# Patient Record
Sex: FEMALE | Race: WHITE
[De-identification: names, ages, dates, MRNs, and addresses within clinical notes are randomized per-mention and may not be internally consistent; named-entity substitution may affect disease eponyms.]

---

## 2018-07-18 ENCOUNTER — HOSPITAL ENCOUNTER (EMERGENCY)
Dept: HOSPITAL 33 - ED | Age: 68
Discharge: HOME | End: 2018-07-18
Payer: MEDICARE

## 2018-07-18 VITALS — DIASTOLIC BLOOD PRESSURE: 75 MMHG | SYSTOLIC BLOOD PRESSURE: 144 MMHG | OXYGEN SATURATION: 96 % | HEART RATE: 63 BPM

## 2018-07-18 DIAGNOSIS — H92.09: ICD-10-CM

## 2018-07-18 DIAGNOSIS — Z72.0: ICD-10-CM

## 2018-07-18 DIAGNOSIS — F32.9: ICD-10-CM

## 2018-07-18 DIAGNOSIS — Z79.899: ICD-10-CM

## 2018-07-18 DIAGNOSIS — K11.20: Primary | ICD-10-CM

## 2018-07-18 LAB
ALBUMIN SERPL-MCNC: 3.9 G/DL (ref 3.5–5)
ALP SERPL-CCNC: 101 U/L (ref 38–126)
ALT SERPL-CCNC: 10 U/L (ref 0–35)
ANION GAP SERPL CALC-SCNC: 9.5 MEQ/L (ref 5–15)
AST SERPL QL: 13 U/L (ref 14–36)
BASOPHILS # BLD AUTO: 0.02 10*3/UL (ref 0–0.4)
BASOPHILS NFR BLD AUTO: 0.1 % (ref 0–0.4)
BILIRUB BLD-MCNC: 0.2 MG/DL (ref 0.2–1.3)
BUN SERPL-MCNC: 11 MG/DL (ref 7–17)
CALCIUM SPEC-MCNC: 9.2 MG/DL (ref 8.4–10.2)
CHLORIDE SERPL-SCNC: 103 MMOL/L (ref 98–107)
CO2 SERPL-SCNC: 28 MMOL/L (ref 22–30)
CREAT SERPL-MCNC: 0.62 MG/DL (ref 0.52–1.04)
EOSINOPHIL # BLD AUTO: 0 10*3/UL (ref 0–0.5)
GLUCOSE SERPL-MCNC: 99 MG/DL (ref 74–106)
GRANULOCYTES # BLD AUTO: 10.17 10*3/UL (ref 1.4–6.9)
HCT VFR BLD AUTO: 38.9 % (ref 35–47)
HGB BLD-MCNC: 13.5 GM/DL (ref 12–16)
LYMPHOCYTES # SPEC AUTO: 2.02 10*3/UL (ref 1–4.6)
MCH RBC QN AUTO: 32.8 PG (ref 26–32)
MCHC RBC AUTO-ENTMCNC: 34.7 G/DL (ref 32–36)
MONOCYTES # BLD AUTO: 1.36 10*3/UL (ref 0–1.3)
NEUTROPHILS NFR BLD AUTO: 75 % (ref 36–66)
PLATELET # BLD AUTO: 178 K/MM3 (ref 150–450)
POTASSIUM SERPLBLD-SCNC: 3.3 MMOL/L (ref 3.5–5.1)
PROT SERPL-MCNC: 6.8 G/DL (ref 6.3–8.2)
RBC # BLD AUTO: 4.11 M/MM3 (ref 4.1–5.4)
SODIUM SERPL-SCNC: 136 MMOL/L (ref 137–145)
WBC # BLD AUTO: 13.6 K/MM3 (ref 4–10.5)

## 2018-07-18 PROCEDURE — 86308 HETEROPHILE ANTIBODY SCREEN: CPT

## 2018-07-18 PROCEDURE — 87651 STREP A DNA AMP PROBE: CPT

## 2018-07-18 PROCEDURE — 80053 COMPREHEN METABOLIC PANEL: CPT

## 2018-07-18 PROCEDURE — 71046 X-RAY EXAM CHEST 2 VIEWS: CPT

## 2018-07-18 PROCEDURE — 85652 RBC SED RATE AUTOMATED: CPT

## 2018-07-18 PROCEDURE — 85025 COMPLETE CBC W/AUTO DIFF WBC: CPT

## 2018-07-18 PROCEDURE — 70490 CT SOFT TISSUE NECK W/O DYE: CPT

## 2018-07-18 PROCEDURE — 96361 HYDRATE IV INFUSION ADD-ON: CPT

## 2018-07-18 PROCEDURE — 96360 HYDRATION IV INFUSION INIT: CPT

## 2018-07-18 PROCEDURE — 96374 THER/PROPH/DIAG INJ IV PUSH: CPT

## 2018-07-18 PROCEDURE — 83605 ASSAY OF LACTIC ACID: CPT

## 2018-07-18 PROCEDURE — 36415 COLL VENOUS BLD VENIPUNCTURE: CPT

## 2018-07-18 PROCEDURE — 99284 EMERGENCY DEPT VISIT MOD MDM: CPT

## 2018-07-18 NOTE — XRAY
Indication: Cough.



Comparison: None



PA/lateral chest hyperinflated with chronic lung markings and a few incidental

calcified granulomas.  No focal infiltrate, consolidation, or large effusion.

Heart is not enlarged.  Small hiatal hernia suggested.  Bony thorax intact

with mild multilevel degenerative spondylosis and mild dextroscoliosis.



Impression: Nonacute chest with chronic features.

## 2018-07-18 NOTE — ERPHSYRPT
- History of Present Illness


Time Seen by Provider: 07/18/18 02:33


Source: patient


Exam Limitations: no limitations


Patient Subjective Stated Complaint: right side of neck and below ear are 

swollen


Triage Nursing Assessment: pt A&O x3, c/o right sided neck swelling, throat 

hurts, ear hurts, denies breathing difficulty, afebrile, nausea, /99, 

doesn't appear to be in any distress


Physician History: 





Pt started c/o sore throat, painful swallowing, nausea about one week ago. She 

was seen by her doctor yesterday, started on PO steroid. She noticed painful 

swelling in the right mandibular angle area, when eating tonight. She denies 

fever, chills, difficulty breathing, chest pain, vomiting, but nauseated. 


Timing/Duration: gradual onset


Severity: severe


ENT Location: ear (R), throat


Prearrival Treatment: prescription meds


Modifying Factors: Improves With: nothing


Associated Symptoms: ear pain (R), No ear drainage, No hearing loss


Allergies/Adverse Reactions: 








No Known Drug Allergies Allergy (Verified 07/18/18 02:38)


 





Home Medications: 








Atorvastatin Calcium [Lipitor 20MG Tablet] 20 mg PO DAILY 07/18/18 [History]


Bupropion HCl [Bupropion Xl] 300 mg PO DAILY 07/18/18 [History]


Levothyroxine Sodium 50 Mcg*** [Synthroid 50 Mcg***] 50 mcg PO DAILY 07/18/18 [

History]


Montelukast Sodium 10 mg** [Singulair 10 MG**] 10 mg PO DAILY 07/18/18 [History]


Omeprazole 40 mg PO DAILY 07/18/18 [History]


Ropinirole HCl 0.5 mg*** [Requip 0.5 MG***] 0.5 mg PO DAILY 07/18/18 [History]


Sucralfate 1 gm*** [Carafate 1 GM***] 1 gm PO DAILY 07/18/18 [History]


Trazodone HCl 50 mg*** [Desyrel 50 mg***] 50 mg PO DAILY 07/18/18 [History]








- Review of Systems


Constitutional: No Symptoms


Ears, Nose, & Throat: Ear Pain


Respiratory: Cough (chronic)


Cardiac: No Chest Pain, No Edema


All Other Systems: Reviewed and Negative





- Past Medical History


Respiratory History: COPD


Psycho-Social History: Depression





- Past Surgical History


Past Surgical History: Yes


Female Surgical History: Hysterectomy


Other Surgical History: tummy tuck





- Social History


Smoking Status: Current every day smoker


How long have you smoked: 50 years


Exposure to second hand smoke: Yes


Drug Use: none


Patient Lives Alone: No





- Nursing Vital Signs


Nursing Vital Signs: 


 Initial Vital Signs











Temperature  97.9 F   07/18/18 02:25


 


Pulse Rate  63   07/18/18 02:25


 


Blood Pressure  149/99   07/18/18 02:25


 


O2 Sat by Pulse Oximetry  98   07/18/18 02:25








 Pain Scale











Pain Intensity                 7

















- Physical Exam


General Appearance: no apparent distress


Eye Exam: bilateral eye: PERRL


Ear Exam: right ear: other (soft tissue swelling of the neck at the right 

mandibular abgle, no redness, no mass, or fluctuation, no skin lesion.), 

bilateral ear: canal normal, TM normal


Nasal Exam: normal inspection


Throat Exam: normal, pharynx normal, mandibular swelling (right mandibular angle

)


Neck Exam: normal inspection, non-tender, supple, full range of motion, trachea 

midline, No JVD, No lymphadenopathy (R), No lymphadenopathy (L), No thyromegaly


Cardiovascular/Respiratory Exam: chest non-tender, normal breath sounds, 

regular rate/rhythm, heart sounds normal, no respiratory distress, No no JVD


Abdominal Exam: non-tender, soft, no organomegaly


Neurologic Exam: alert, oriented x 3


Skin Exam: normal color, warm, dry, No rash


**SpO2 Interpretation**: normal


SpO2: 98


Oxygen Delivery: Room Air





- Course


Nursing assessment & vital signs reviewed: Yes





- CT Exams


  ** Soft Tissue Neck


CT Interpretation: Tele-radiologist Report, Other (right parotitis)


Ordered Tests: 


 Active Orders 24 hr











 Category Date Time Status


 


 IV Insertion STAT Care  07/18/18 02:40 Active


 


 CHEST 2 VIEWS (PA AND LAT) Stat Exams  07/18/18 02:47 Taken


 


 NECK WO CONTRAST [CT] Stat Exams  07/18/18 02:43 Taken


 


 CBC W DIFF Stat Lab  07/18/18 02:59 Completed


 


 CMP Stat Lab  07/18/18 02:59 Completed


 


 Erythrocyte Sedimentation Rate Stat Lab  07/18/18 02:59 Completed


 


 Lactic Acid Stat Lab  07/18/18 02:41 Completed


 


 Mono Screen Stat Lab  07/18/18 02:59 Completed








Medication Summary











Generic Name Dose Route Start Last Admin





  Trade Name Freq  PRN Reason Stop Dose Admin


 


Sodium Chloride  1,000 mls @ 100 mls/hr  07/18/18 02:45  07/18/18 02:58





  Sodium Chloride 0.9% 1000 Ml  IV  08/17/18 02:44  100 mls/hr





  .Q10H CHERRY   Administration





     





     





     





     














Discontinued Medications














Generic Name Dose Route Start Last Admin





  Trade Name Zac  PRN Reason Stop Dose Admin


 


Clindamycin Phosphate  600 mg  07/18/18 04:21  





  Cleocin Phosphate Iv 600 Mg/4 Ml***  IV  07/18/18 04:22  





  STAT ONE   





     





     





     





     


 


Dexamethasone Sodium Phosphate  10 mg  07/18/18 02:42  07/18/18 02:59





  Decadron 10mg Inj.  IV  07/18/18 02:43  10 mg





  STAT ONE   Administration





     





     





     





     


 


Dexamethasone Sodium Phosphate  Confirm  07/18/18 02:58  





  Decadron 10mg Inj.  Administered  07/18/18 02:59  





  Dose   





  10 mg   





  .ROUTE   





  .[employer] ONE   





     





     





     





     











Lab/Rad Data: 


 Laboratory Result Diagrams





 07/18/18 02:59 





 07/18/18 02:59 





 Laboratory Results











  07/18/18 07/18/18 07/18/18 Range/Units





  02:59 02:59 02:59 


 


WBC     (4.0-10.5)  K/mm3


 


RBC     (4.1-5.4)  M/mm3


 


Hgb     (12.0-16.0)  gm/dl


 


Hct     (35-47)  %


 


MCV     ()  fl


 


MCH     (26-32)  pg


 


MCHC     (32-36)  g/dl


 


RDW     (11.5-14.0)  %


 


Plt Count     (150-450)  K/mm3


 


MPV     (6-9.5)  fl


 


Gran %     (36.0-66.0)  %


 


Eos # (Auto)     (0-0.5)  


 


Absolute Lymphs (auto)     (1.0-4.6)  


 


Absolute Monos (auto)     (0.0-1.3)  


 


Lymphocytes %     (24.0-44.0)  %


 


Monocytes %     (0.0-12.0)  %


 


Eosinophils %     (0.00-5.0)  %


 


Basophils %     (0.0-0.4)  %


 


Absolute Granulocytes     (1.4-6.9)  


 


Basophils #     (0-0.4)  


 


ESR     (0-20)  mm/hr


 


Sodium    136 L  (137-145)  mmol/L


 


Potassium    3.3 L  (3.5-5.1)  mmol/L


 


Chloride    103  ()  mmol/L


 


Carbon Dioxide    28  (22-30)  mmol/L


 


Anion Gap    9.5  (5-15)  MEQ/L


 


BUN    11  (7-17)  mg/dL


 


Creatinine    0.62  (0.52-1.04)  mg/dL


 


Estimated GFR    > 60.0  ML/MIN


 


Glucose    99  ()  mg/dL


 


Lactic Acid     (0.4-2.0)  


 


Calcium    9.2  (8.4-10.2)  mg/dL


 


Total Bilirubin    0.20  (0.2-1.3)  mg/dL


 


AST    13 L  (14-36)  U/L


 


ALT    10  (0-35)  U/L


 


Alkaline Phosphatase    101  ()  U/L


 


Serum Total Protein    6.8  (6.3-8.2)  g/dL


 


Albumin    3.9  (3.5-5.0)  g/dL


 


Monoscreen   NEGATIVE   (Negative)  


 


Group A Strep Antibody  NEGATIVE    (NEGATIVE)  














  07/18/18 07/18/18 Range/Units





  02:59 02:41 


 


WBC  13.6 H   (4.0-10.5)  K/mm3


 


RBC  4.11   (4.1-5.4)  M/mm3


 


Hgb  13.5   (12.0-16.0)  gm/dl


 


Hct  38.9   (35-47)  %


 


MCV  94.6   ()  fl


 


MCH  32.8 H   (26-32)  pg


 


MCHC  34.7   (32-36)  g/dl


 


RDW  12.4   (11.5-14.0)  %


 


Plt Count  178   (150-450)  K/mm3


 


MPV  10.0 H   (6-9.5)  fl


 


Gran %  75.0 H   (36.0-66.0)  %


 


Eos # (Auto)  0   (0-0.5)  


 


Absolute Lymphs (auto)  2.02   (1.0-4.6)  


 


Absolute Monos (auto)  1.36 H   (0.0-1.3)  


 


Lymphocytes %  14.9 L   (24.0-44.0)  %


 


Monocytes %  10.0   (0.0-12.0)  %


 


Eosinophils %  0.0   (0.00-5.0)  %


 


Basophils %  0.1   (0.0-0.4)  %


 


Absolute Granulocytes  10.17 H   (1.4-6.9)  


 


Basophils #  0.02   (0-0.4)  


 


ESR  45 H   (0-20)  mm/hr


 


Sodium    (137-145)  mmol/L


 


Potassium    (3.5-5.1)  mmol/L


 


Chloride    ()  mmol/L


 


Carbon Dioxide    (22-30)  mmol/L


 


Anion Gap    (5-15)  MEQ/L


 


BUN    (7-17)  mg/dL


 


Creatinine    (0.52-1.04)  mg/dL


 


Estimated GFR    ML/MIN


 


Glucose    ()  mg/dL


 


Lactic Acid   0.7  (0.4-2.0)  


 


Calcium    (8.4-10.2)  mg/dL


 


Total Bilirubin    (0.2-1.3)  mg/dL


 


AST    (14-36)  U/L


 


ALT    (0-35)  U/L


 


Alkaline Phosphatase    ()  U/L


 


Serum Total Protein    (6.3-8.2)  g/dL


 


Albumin    (3.5-5.0)  g/dL


 


Monoscreen    (Negative)  


 


Group A Strep Antibody    (NEGATIVE)  














- Progress


Progress: improved


Progress Note: 





07/18/18 04:31


Pt was given iv Decadron 10 mg, 600 mg iv Clindamycin, improved, denies 

difficulty swallowing, no fever, or severe headaches, stable. She was educated 

about her results and the nature of her disease, will discharge her to follow 

up with her physician in 2-3 days, return if severe pain, swelling, difficulty 

swallowing, severe headaches, vomiting, fever> 102 F.


Counseled pt/family regarding: lab results, diagnosis, need for follow-up, rad 

results





- Departure


Time of Disposition: 04:34


Departure Disposition: Home


Clinical Impression: 


 Parotitis





Condition: Stable


Critical Care Time: No


Referrals: 


Provider,Unknown [Primary Care Provider] - 


Instructions:  Parotitis, Salivary Gland Infection (DC)


Additional Instructions: 


Rest x 2-3 days, drink plenty of fluids, follow up with your physician in 2-3 

days, return if severe pain, vomiting, difficulty swallowing, headaches, high 

fever> 102 F!


Prescriptions: 


Clindamycin HCl [Cleocin HCl] 300 mg PO QID #40 capsule

## 2018-07-18 NOTE — XRAY
Indication: Right neck pain/swelling.



Multiple contiguous axial images obtained through the neck without contrast as

ordered.



Comparison: None



Patient is edentulous.  Enlarged right parotid gland with minimal stranding

favoring parotiditis.  Remaining left parotid and submandibular glands are

unremarkable.  Small centimeter/subcentimeter cervical lymph nodes

bilaterally.  Mild carotid calcifications, right greater than left.  Supra-and

infraglottic airway is widely patent.  Visualized cervical spine intact with

mild C5-C7 degenerative disc disease.  Minimal mucosal thickening of the

inferior right maxillary sinus.  Remaining visualized paranasal sinuses and

mastoid air cells are clear.



Base of the brain unremarkable.  Lung apices demonstrates pulmonary emphysema

and right upper lobe calcified granuloma.



Impression:

1.  Right parotiditis.

2.  Incidental minimal right maxillary sinus disease, mild C5-C7 degenerative

disc disease, and pulmonary emphysema.



Comment: Preliminary interpretation was made by [facility].  No discrepancy.



CT DI 6.99

## 2019-07-24 ENCOUNTER — HOSPITAL ENCOUNTER (EMERGENCY)
Dept: HOSPITAL 33 - ED | Age: 69
Discharge: HOME | End: 2019-07-24
Payer: MEDICARE

## 2019-07-24 VITALS — OXYGEN SATURATION: 97 %

## 2019-07-24 VITALS — DIASTOLIC BLOOD PRESSURE: 90 MMHG | HEART RATE: 65 BPM | SYSTOLIC BLOOD PRESSURE: 165 MMHG

## 2019-07-24 DIAGNOSIS — T60.3X1A: Primary | ICD-10-CM

## 2019-07-24 DIAGNOSIS — H57.9: ICD-10-CM

## 2019-07-24 PROCEDURE — 99283 EMERGENCY DEPT VISIT LOW MDM: CPT

## 2019-07-24 NOTE — ERPHSYRPT
- History of Present Illness


Time Seen by Provider: 07/24/19 22:06


Source: patient


Exam Limitations: no limitations


Patient Subjective Stated Complaint: Left eye injury (weed killer in eye)


Triage Nursing Assessment: Patient ambulated into ED and transferred self to 

bed. Patient A+O X 3. Patient's skin pink, warm and dry. Patient complains of 

left eye irritation after weed killer was splashed in her eye around 2030.  

Patient states she did call poisen control and was instructed to irrigate eye 

with eye wash solution and instructed patient if she felt something sharp in 

eye to come to ED. Patient states constant dull scratchy feeling to left eye. 

Left eye noted to be slightly swollen and red.


Physician History: 


Insect distracted her and she accidentally got weed spray in L eye.  Washed it 

out twice and then called poison control; told to come to ER if it was burning 

or sharp pain.





Allergies/Adverse Reactions: 








No Known Drug Allergies Allergy (Verified 07/24/19 21:11)


 





Home Medications: 








Atorvastatin Calcium [Lipitor 20MG Tablet] 20 mg PO DAILY 07/18/18 [History]


Bupropion HCl [Bupropion Xl] 300 mg PO DAILY 07/18/18 [History]


Levothyroxine Sodium 50 Mcg*** [Synthroid 50 Mcg***] 50 mcg PO DAILY 07/18/18 [

History]


Montelukast Sodium 10 mg** [Singulair 10 MG**] 10 mg PO DAILY 07/18/18 [History]


Omeprazole 40 mg PO DAILY 07/18/18 [History]


Ropinirole HCl 0.5 mg*** [Requip 0.5 MG***] 0.5 mg PO DAILY 07/18/18 [History]


Sucralfate 1 gm*** [Carafate 1 GM***] 1 gm PO DAILY 07/18/18 [History]


Trazodone HCl 50 mg*** [Desyrel 50 mg***] 50 mg PO DAILY 07/18/18 [History]





Hx Influenza Vaccination/Date Given: Yes


Hx Pneumococcal Vaccination/Date Given: Yes


Immunizations Up to Date: Yes





- Past Medical History


Pertinent Past Medical History: Yes


Neurological History: No Pertinent History


ENT History: No Pertinent History


Cardiac History: No Pertinent History


Respiratory History: COPD


Endocrine Medical History: No Pertinent History


Musculoskeletal History: No Pertinent History


GI Medical History: No Pertinent History


 History: No Pertinent History


Psycho-Social History: Depression


Female Reproductive Disorders: No Pertinent History





- Past Surgical History


Past Surgical History: Yes


Neuro Surgical History: No Pertinent History


Cardiac: No Pertinent History


Respiratory: No Pertinent History


Gastrointestinal: No Pertinent History


Female Surgical History: Hysterectomy


Other Surgical History: tummy tuck





- Social History


Smoking Status: Current every day smoker


How long have you smoked: years


Exposure to second hand smoke: Yes


Drug Use: none


Patient Lives Alone: No





- Female History


Hx Last Menstrual Period: hysterectomy


Hx Pregnant Now: No





- Nursing Vital Signs


Nursing Vital Signs: 


 Initial Vital Signs











Temperature  97.9 F   07/24/19 21:11


 


Pulse Rate  72   07/24/19 21:11


 


Respiratory Rate  18   07/24/19 21:11


 


Blood Pressure  154/88   07/24/19 21:11


 


O2 Sat by Pulse Oximetry  97   07/24/19 21:11








 Pain Scale











Pain Intensity                 7

















- Physical Exam


Vision Acuity Degree Evaluation Phase: Uncorrected


Vision Acuity Right Eye: 20/30


Vision Acuity Left Eye: 20/50


SpO2: 97


Ordered Tests: 


Medication Summary











Generic Name Dose Route Start Last Admin





  Trade Name Freq  PRN Reason Stop Dose Admin


 


Tobramycin/Dexamethasone  0 ml  07/25/19 10:00  07/24/19 22:24





  Tobradex Eye Drops***  OP  08/24/19 09:59  Not Given





  BID CHERRY   





     





     





     





     














Discontinued Medications














Generic Name Dose Route Start Last Admin





  Trade Name Freq  PRN Reason Stop Dose Admin


 


Tobramycin/Dexamethasone  Confirm  07/24/19 22:15  





  Tobradex Eye Drops***  Administered  07/24/19 22:16  





  Dose   





  2.5 ml   





  .ROUTE   





  .[facility] ONE   





     





     





     





     














- Departure


Departure Disposition: Home


Clinical Impression: 


 Eye irritation





Condition: Stable


Critical Care Time: No


Referrals: 


DARY STEINBERG NP [Primary Care Provider] - 


Additional Instructions: 


Use Tobradex eye drops - one or two drops in Left eye twice a day for 4 days.  

Follow up with primary care as needed or with eye doctor is not improving in 

next two days or if getting more irritated in the left eye.